# Patient Record
Sex: MALE | Race: OTHER | HISPANIC OR LATINO | URBAN - METROPOLITAN AREA
[De-identification: names, ages, dates, MRNs, and addresses within clinical notes are randomized per-mention and may not be internally consistent; named-entity substitution may affect disease eponyms.]

---

## 2017-10-26 ENCOUNTER — EMERGENCY (EMERGENCY)
Facility: HOSPITAL | Age: 67
LOS: 1 days | Discharge: HOME | End: 2017-10-26
Attending: EMERGENCY MEDICINE
Payer: MEDICAID

## 2017-10-26 VITALS
WEIGHT: 151.9 LBS | OXYGEN SATURATION: 95 % | HEART RATE: 61 BPM | SYSTOLIC BLOOD PRESSURE: 107 MMHG | DIASTOLIC BLOOD PRESSURE: 60 MMHG | RESPIRATION RATE: 18 BRPM | HEIGHT: 68.9 IN | TEMPERATURE: 98 F

## 2017-10-26 DIAGNOSIS — L03.116 CELLULITIS OF LEFT LOWER LIMB: ICD-10-CM

## 2017-10-26 DIAGNOSIS — E78.5 HYPERLIPIDEMIA, UNSPECIFIED: ICD-10-CM

## 2017-10-26 DIAGNOSIS — Z87.891 PERSONAL HISTORY OF NICOTINE DEPENDENCE: ICD-10-CM

## 2017-10-26 PROCEDURE — 99283 EMERGENCY DEPT VISIT LOW MDM: CPT

## 2017-10-26 PROCEDURE — 99284 EMERGENCY DEPT VISIT MOD MDM: CPT

## 2017-10-26 RX ORDER — DIPHENHYDRAMINE HCL 50 MG
50 CAPSULE ORAL ONCE
Qty: 0 | Refills: 0 | Status: COMPLETED | OUTPATIENT
Start: 2017-10-26 | End: 2017-10-26

## 2017-10-26 RX ADMIN — Medication 50 MILLIGRAM(S): at 19:58

## 2017-10-26 RX ADMIN — Medication 100 MILLIGRAM(S): at 20:36

## 2017-10-26 NOTE — ED ADULT NURSE NOTE - OBJECTIVE STATEMENT
redness, swelling left  lower leg from bug bite friday redness, swelling left  lower leg from bug bite friday not getting better with itchiness around the site, no drainage.

## 2017-10-26 NOTE — ED ADULT NURSE NOTE - CHIEF COMPLAINT QUOTE
Redness, swelling, warmth to left lateral calf s/p [facility] Redness extending to foot

## 2017-10-26 NOTE — ED PROVIDER NOTE - SKIN NEGATIVE STATEMENT, MLM
no abrasions, no jaundice, no lesions, no pruritis : +redness , +erythema , +warmth , +swelling on left lateral side of calf.

## 2017-10-26 NOTE — ED PROVIDER NOTE - SKIN TEMP
left lateral leg - erythema and  firm swelling/warm left lateral leg - erythema and  firm swelling, approx 2 inch diameter, no fluctuance/warm

## 2017-10-26 NOTE — ED ADULT NURSE NOTE - CHIEF COMPLAINT
The patient is a 67y Male complaining of The patient is a 67y Male complaining of redness, swelling to left lower leg from bug bite.

## 2017-10-26 NOTE — ED PROVIDER NOTE - OBJECTIVE STATEMENT
67 year old male Hx HLD ( diet controlled) coming in for left lateral calf swelling after bug bite in [place]. Pt speaks [place], daughter at bedside helped in translation. As per pt he was [place] when he experienced bug bite ( mosquito ?) on his left lateral side on last friday. after that he developed redness and swelling around area. for past few days he had pus coming out from bug bite site.  He came back yesterday and wanted to have evaluation for his bug bite so he can go to [place] next week. Pt denies any fever, body ache, chills, headache, chest pain , abd pain , N/V/D.

## 2017-10-26 NOTE — ED PROVIDER NOTE - ATTENDING CONTRIBUTION TO CARE
agree with above. gave first dose of abx.  Discussed anticipatory guidance and return precautions. Discussed results and gave copy to pt.  Dc with outpt follow up in 3 days for wound check.

## 2017-10-26 NOTE — ED PROVIDER NOTE - MEDICAL DECISION MAKING DETAILS
gave first dose of abx. marked area of erythema  Discussed anticipatory guidance and return precautions. Discussed results and gave copy to pt.  Tommy with outpt follow up in 3 days for wound check.

## 2017-10-26 NOTE — ED ADULT TRIAGE NOTE - CHIEF COMPLAINT QUOTE
Redness, swelling, warmth to left lateral calf [facility]/p [facility] extending to [facility]

## 2017-10-28 ENCOUNTER — INPATIENT (INPATIENT)
Facility: HOSPITAL | Age: 67
LOS: 3 days | Discharge: HOME | DRG: 603 | End: 2017-11-01
Attending: INTERNAL MEDICINE | Admitting: INTERNAL MEDICINE
Payer: MEDICAID

## 2017-10-28 VITALS
SYSTOLIC BLOOD PRESSURE: 140 MMHG | DIASTOLIC BLOOD PRESSURE: 81 MMHG | RESPIRATION RATE: 16 BRPM | OXYGEN SATURATION: 99 % | WEIGHT: 151.9 LBS | HEART RATE: 70 BPM | TEMPERATURE: 97 F

## 2017-10-28 NOTE — ED ADULT NURSE NOTE - ED STAT RN HANDOFF DETAILS 2
endorsed to RN Narciso in holding area in stable condition for continuation of care. pt a&ox3, ambulatory. admitted for left leg cellulitis. admitted to tele for bradycardia. family at bedside. 20G RAC.

## 2017-10-29 DIAGNOSIS — L03.90 CELLULITIS, UNSPECIFIED: ICD-10-CM

## 2017-10-29 DIAGNOSIS — Z29.9 ENCOUNTER FOR PROPHYLACTIC MEASURES, UNSPECIFIED: ICD-10-CM

## 2017-10-29 DIAGNOSIS — E78.00 PURE HYPERCHOLESTEROLEMIA, UNSPECIFIED: ICD-10-CM

## 2017-10-29 LAB
ALBUMIN SERPL ELPH-MCNC: 3.5 G/DL — SIGNIFICANT CHANGE UP (ref 3.5–5)
ALP SERPL-CCNC: 112 U/L — SIGNIFICANT CHANGE UP (ref 40–120)
ALT FLD-CCNC: 49 U/L DA — SIGNIFICANT CHANGE UP (ref 10–60)
ANION GAP SERPL CALC-SCNC: 8 MMOL/L — SIGNIFICANT CHANGE UP (ref 5–17)
APTT BLD: 29.8 SEC — SIGNIFICANT CHANGE UP (ref 27.5–37.4)
AST SERPL-CCNC: 30 U/L — SIGNIFICANT CHANGE UP (ref 10–40)
BILIRUB SERPL-MCNC: 0.3 MG/DL — SIGNIFICANT CHANGE UP (ref 0.2–1.2)
BUN SERPL-MCNC: 29 MG/DL — HIGH (ref 7–18)
CALCIUM SERPL-MCNC: 8.5 MG/DL — SIGNIFICANT CHANGE UP (ref 8.4–10.5)
CHLORIDE SERPL-SCNC: 108 MMOL/L — SIGNIFICANT CHANGE UP (ref 96–108)
CO2 SERPL-SCNC: 24 MMOL/L — SIGNIFICANT CHANGE UP (ref 22–31)
CREAT SERPL-MCNC: 0.92 MG/DL — SIGNIFICANT CHANGE UP (ref 0.5–1.3)
CRP SERPL-MCNC: 3 MG/DL — HIGH (ref 0–0.4)
D DIMER BLD IA.RAPID-MCNC: 193 NG/ML DDU — SIGNIFICANT CHANGE UP
ERYTHROCYTE [SEDIMENTATION RATE] IN BLOOD: 24 MM/HR — HIGH (ref 0–20)
GLUCOSE SERPL-MCNC: 103 MG/DL — HIGH (ref 70–99)
HCT VFR BLD CALC: 41.6 % — SIGNIFICANT CHANGE UP (ref 39–50)
HGB BLD-MCNC: 13.8 G/DL — SIGNIFICANT CHANGE UP (ref 13–17)
INR BLD: 0.96 RATIO — SIGNIFICANT CHANGE UP (ref 0.88–1.16)
MCHC RBC-ENTMCNC: 33 PG — SIGNIFICANT CHANGE UP (ref 27–34)
MCHC RBC-ENTMCNC: 33.2 GM/DL — SIGNIFICANT CHANGE UP (ref 32–36)
MCV RBC AUTO: 99.2 FL — SIGNIFICANT CHANGE UP (ref 80–100)
PLATELET # BLD AUTO: 197 K/UL — SIGNIFICANT CHANGE UP (ref 150–400)
POTASSIUM SERPL-MCNC: 4.4 MMOL/L — SIGNIFICANT CHANGE UP (ref 3.5–5.3)
POTASSIUM SERPL-SCNC: 4.4 MMOL/L — SIGNIFICANT CHANGE UP (ref 3.5–5.3)
PROT SERPL-MCNC: 7.1 G/DL — SIGNIFICANT CHANGE UP (ref 6–8.3)
PROTHROM AB SERPL-ACNC: 10.4 SEC — SIGNIFICANT CHANGE UP (ref 9.8–12.7)
RBC # BLD: 4.2 M/UL — SIGNIFICANT CHANGE UP (ref 4.2–5.8)
RBC # FLD: 12 % — SIGNIFICANT CHANGE UP (ref 10.3–14.5)
SODIUM SERPL-SCNC: 140 MMOL/L — SIGNIFICANT CHANGE UP (ref 135–145)
WBC # BLD: 5.2 K/UL — SIGNIFICANT CHANGE UP (ref 3.8–10.5)
WBC # FLD AUTO: 5.2 K/UL — SIGNIFICANT CHANGE UP (ref 3.8–10.5)

## 2017-10-29 PROCEDURE — 99053 MED SERV 10PM-8AM 24 HR FAC: CPT

## 2017-10-29 PROCEDURE — 99285 EMERGENCY DEPT VISIT HI MDM: CPT | Mod: 25

## 2017-10-29 PROCEDURE — 73590 X-RAY EXAM OF LOWER LEG: CPT | Mod: 26,LT

## 2017-10-29 RX ORDER — INFLUENZA VIRUS VACCINE 15; 15; 15; 15 UG/.5ML; UG/.5ML; UG/.5ML; UG/.5ML
0.5 SUSPENSION INTRAMUSCULAR ONCE
Qty: 0 | Refills: 0 | Status: COMPLETED | OUTPATIENT
Start: 2017-10-29 | End: 2017-11-01

## 2017-10-29 RX ORDER — VANCOMYCIN HCL 1 G
1000 VIAL (EA) INTRAVENOUS ONCE
Qty: 0 | Refills: 0 | Status: COMPLETED | OUTPATIENT
Start: 2017-10-29 | End: 2017-10-29

## 2017-10-29 RX ORDER — SODIUM CHLORIDE 9 MG/ML
1000 INJECTION INTRAMUSCULAR; INTRAVENOUS; SUBCUTANEOUS ONCE
Qty: 0 | Refills: 0 | Status: COMPLETED | OUTPATIENT
Start: 2017-10-29 | End: 2017-10-29

## 2017-10-29 RX ORDER — OXYCODONE AND ACETAMINOPHEN 5; 325 MG/1; MG/1
1 TABLET ORAL ONCE
Qty: 0 | Refills: 0 | Status: DISCONTINUED | OUTPATIENT
Start: 2017-10-29 | End: 2017-10-29

## 2017-10-29 RX ORDER — PIPERACILLIN AND TAZOBACTAM 4; .5 G/20ML; G/20ML
3.38 INJECTION, POWDER, LYOPHILIZED, FOR SOLUTION INTRAVENOUS EVERY 8 HOURS
Qty: 0 | Refills: 0 | Status: DISCONTINUED | OUTPATIENT
Start: 2017-10-29 | End: 2017-10-30

## 2017-10-29 RX ORDER — PIPERACILLIN AND TAZOBACTAM 4; .5 G/20ML; G/20ML
3.38 INJECTION, POWDER, LYOPHILIZED, FOR SOLUTION INTRAVENOUS ONCE
Qty: 0 | Refills: 0 | Status: COMPLETED | OUTPATIENT
Start: 2017-10-29 | End: 2017-10-29

## 2017-10-29 RX ORDER — ACETAMINOPHEN 500 MG
650 TABLET ORAL EVERY 6 HOURS
Qty: 0 | Refills: 0 | Status: DISCONTINUED | OUTPATIENT
Start: 2017-10-29 | End: 2017-11-01

## 2017-10-29 RX ADMIN — OXYCODONE AND ACETAMINOPHEN 1 TABLET(S): 5; 325 TABLET ORAL at 03:41

## 2017-10-29 RX ADMIN — PIPERACILLIN AND TAZOBACTAM 200 GRAM(S): 4; .5 INJECTION, POWDER, LYOPHILIZED, FOR SOLUTION INTRAVENOUS at 06:31

## 2017-10-29 RX ADMIN — OXYCODONE AND ACETAMINOPHEN 1 TABLET(S): 5; 325 TABLET ORAL at 03:44

## 2017-10-29 RX ADMIN — PIPERACILLIN AND TAZOBACTAM 25 GRAM(S): 4; .5 INJECTION, POWDER, LYOPHILIZED, FOR SOLUTION INTRAVENOUS at 21:58

## 2017-10-29 RX ADMIN — Medication 250 MILLIGRAM(S): at 03:44

## 2017-10-29 RX ADMIN — SODIUM CHLORIDE 1000 MILLILITER(S): 9 INJECTION INTRAMUSCULAR; INTRAVENOUS; SUBCUTANEOUS at 03:42

## 2017-10-29 RX ADMIN — PIPERACILLIN AND TAZOBACTAM 25 GRAM(S): 4; .5 INJECTION, POWDER, LYOPHILIZED, FOR SOLUTION INTRAVENOUS at 13:09

## 2017-10-29 NOTE — H&P ADULT - ASSESSMENT
66 yo male came from home with PMH of Hypercholesterolemia presents to ED c/o left leg pain with associated L leg swelling for 9 days. As per pt when he was in [facility] he experienced bug bite on his left lateral side 9 days ago, after that he developed burning pain, redness and swelling around area. For past few days he had pus coming out from bug bite site. [place] from [facility] 5 days ago. Pt was brought to the ED x4 days ago and was diagnosed with cellulitis, and was subsequently placed on doxycycline however symptoms have currently worsened. Family notes pt is having difficulty ambulating secondary to pain. In ER, pt vitals stable, afebrile, WBC wnl, s/p 1 dose vanco and zosyn.  Pt will be admitted to medicine  as cellulitis.

## 2017-10-29 NOTE — ED PROVIDER NOTE - OBJECTIVE STATEMENT
Physician as . 66 y/o M pt with PMHx of Hypercholesterolemia and no significant PSHx presents to ED c/o L leg pain with associated L leg swelling x8 days. As per family, pt recently [place] x[place] ago from [facility]; pt was brought to the ED x3 days ago and was diagnosed with cellulitis, and was subsequently placed on Abx's however sx's have currently worsened. Family notes pt is having difficulty ambulating secondary to pain. Pt denies fever, chills, any other complaints. Pt also denies Hx of DM, Hx of HTN, or taking any medication for pain relief. NKDA.

## 2017-10-29 NOTE — ED ADULT NURSE REASSESSMENT NOTE - NS ED NURSE REASSESS COMMENT FT1
pt a&ox3, admitted for left leg cellulitis. on tele monitoring, yue sinus. family at bedside. pending bed availability on tele unit. offers no complaints at this time.

## 2017-10-29 NOTE — H&P ADULT - PROBLEM SELECTOR PLAN 1
hx of bug bite, burning pain, redness and swelling on left leg, afebrile, WBC nl  -s/p 1 dose vanco and zosyn   -s/p 1L NS bolus  -ESR 24, f/u CRP  -f/u x ray of left leg  -f/u US doppler of left leg  -f/u blood culture  -ID consult hx of bug bite, burning pain, redness and swelling on left leg, afebrile, WBC nl  -s/p 1 dose vanco and zosyn   -s/p 1L NS bolus  -c/w zosyn q8hrs  -ESR 24, f/u CRP  -f/u x ray of left leg  -f/u US doppler of left leg  -f/u blood culture  -ID consult hx of bug bite, burning pain, redness and swelling on left leg, afebrile, WBC nl  -s/p 1 dose vanco and zosyn   -s/p 1L NS bolus  -c/w zosyn q8hrs  -ESR 24, f/u CRP  -f/u x ray of left leg  -f/u US doppler of left leg  -f/u blood culture  -ID consulted,   Left calf, hx of bug bite, burning pain, redness and swelling on left leg, afebrile, WBC nl  -s/p 1 dose vanco and zosyn   -s/p 1L NS bolus  -c/w zosyn q8hrs  -ESR 24, f/u CRP  -f/u x ray of left leg  -f/u US doppler of left leg  -f/u blood culture  -ID consulted,

## 2017-10-29 NOTE — ED PROVIDER NOTE - SKIN WOUND DESCRIPTION
1cm of erythema and warmth to the L lateral leg with no fluctuance; center of area is a very small area of necrosis but no palpable fluctuation or anything drainable

## 2017-10-29 NOTE — H&P ADULT - NSHPLABSRESULTS_GEN_ALL_CORE
Vital Signs Last 24 Hrs  T(C): 36.3 (28 Oct 2017 23:24), Max: 36.3 (28 Oct 2017 23:24)  T(F): 97.4 (28 Oct 2017 23:24), Max: 97.4 (28 Oct 2017 23:24)  HR: 70 (28 Oct 2017 23:24) (70 - 70)  BP: 140/81 (28 Oct 2017 23:24) (140/81 - 140/81)  BP(mean): --  RR: 16 (28 Oct 2017 23:24) (16 - 16)  SpO2: 99% (28 Oct 2017 23:24) (99% - 99%)    D-Dimer Assay, Quantitative (10.29.17 @ 03:42)    D-Dimer Assay, Quantitative: 193: D-Dimer result less than 230 ng/mL DDU correlates with the absence of  thrombosis in a patient with a low and moderate       pre-test probability of thrombosis.  1 DDU is approximately equal to  2 ng/mL FEU (previous units). ng/mL DDU      Complete Blood Count (10.29.17 @ 03:21)    WBC Count: 5.2 K/uL    RBC Count: 4.20 M/uL    Hemoglobin: 13.8 g/dL    Hematocrit: 41.6 %    Mean Cell Volume: 99.2 fl    Mean Cell Hemoglobin: 33.0 pg    Mean Cell Hemoglobin Conc: 33.2 gm/dL    Red Cell Distrib Width: 12.0 %    Platelet Count - Automated: 197 K/uL      Comprehensive Metabolic Panel (10.29.17 @ 03:21)    Sodium, Serum: 140 mmol/L    Potassium, Serum: 4.4 mmol/L    Chloride, Serum: 108 mmol/L    Carbon Dioxide, Serum: 24 mmol/L    Anion Gap, Serum: 8 mmol/L    Blood Urea Nitrogen, Serum: 29 mg/dL    Creatinine, Serum: 0.92 mg/dL    Glucose, Serum: 103 mg/dL    Calcium, Total Serum: 8.5 mg/dL    Protein Total, Serum: 7.1 g/dL    Albumin, Serum: 3.5 g/dL    Bilirubin Total, Serum: 0.3 mg/dL    Alkaline Phosphatase, Serum: 112 U/L    Aspartate Aminotransferase (AST/SGOT): 30 U/L    Alanine Aminotransferase (ALT/SGPT): 49 U/L DA    eGFR if Non : 87:

## 2017-10-29 NOTE — H&P ADULT - ATTENDING COMMENTS
Patient seen and examined. Patient's history, vitals, labs, imaging studies reviewed. Discussed with above resident, agree with note with edits. Plan of care discussed with patient, and agrees, all questions answered.   Radha Alegria MD  10/29/2017

## 2017-10-29 NOTE — H&P ADULT - HISTORY OF PRESENT ILLNESS
66 yo male came from home with PMH of Hypercholesterolemia presents to ED c/o left leg pain with associated L leg swelling for 9 days. As per pt when he was in [facility] he experienced bug bite on his left lateral side 9 days ago, after that he developed burning pain, redness and swelling around area. For past few days he had pus coming out from bug bite site. [place] from [facility] 5 days ago. Pt was brought to the ED x4 days ago and was diagnosed with cellulitis, and was subsequently placed on doxycycline however symptoms have currently worsened. Family notes pt is having difficulty ambulating secondary to pain. Pt denies fever, chills, headache, chest pain, abd pain , n/v/d/c or any other complaints.    In ER, pt vitals stable, afebrile, WBC wnl, s/p 1 dose vanco and zosyn.

## 2017-10-30 LAB
ANION GAP SERPL CALC-SCNC: 5 MMOL/L — SIGNIFICANT CHANGE UP (ref 5–17)
BUN SERPL-MCNC: 19 MG/DL — HIGH (ref 7–18)
CALCIUM SERPL-MCNC: 8.9 MG/DL — SIGNIFICANT CHANGE UP (ref 8.4–10.5)
CHLORIDE SERPL-SCNC: 107 MMOL/L — SIGNIFICANT CHANGE UP (ref 96–108)
CHOLEST SERPL-MCNC: 208 MG/DL — HIGH (ref 10–199)
CO2 SERPL-SCNC: 26 MMOL/L — SIGNIFICANT CHANGE UP (ref 22–31)
CREAT SERPL-MCNC: 0.94 MG/DL — SIGNIFICANT CHANGE UP (ref 0.5–1.3)
FOLATE SERPL-MCNC: 16.3 NG/ML — SIGNIFICANT CHANGE UP (ref 4.8–24.2)
GLUCOSE SERPL-MCNC: 111 MG/DL — HIGH (ref 70–99)
HBA1C BLD-MCNC: 5.9 % — HIGH (ref 4–5.6)
HCT VFR BLD CALC: 43.5 % — SIGNIFICANT CHANGE UP (ref 39–50)
HDLC SERPL-MCNC: 49 MG/DL — SIGNIFICANT CHANGE UP (ref 40–125)
HGB BLD-MCNC: 14.4 G/DL — SIGNIFICANT CHANGE UP (ref 13–17)
LIPID PNL WITH DIRECT LDL SERPL: 139 MG/DL — SIGNIFICANT CHANGE UP
MAGNESIUM SERPL-MCNC: 2.4 MG/DL — SIGNIFICANT CHANGE UP (ref 1.6–2.6)
MCHC RBC-ENTMCNC: 32.3 PG — SIGNIFICANT CHANGE UP (ref 27–34)
MCHC RBC-ENTMCNC: 33 GM/DL — SIGNIFICANT CHANGE UP (ref 32–36)
MCV RBC AUTO: 97.9 FL — SIGNIFICANT CHANGE UP (ref 80–100)
PHOSPHATE SERPL-MCNC: 3.2 MG/DL — SIGNIFICANT CHANGE UP (ref 2.5–4.5)
PLATELET # BLD AUTO: 224 K/UL — SIGNIFICANT CHANGE UP (ref 150–400)
POTASSIUM SERPL-MCNC: 4 MMOL/L — SIGNIFICANT CHANGE UP (ref 3.5–5.3)
POTASSIUM SERPL-SCNC: 4 MMOL/L — SIGNIFICANT CHANGE UP (ref 3.5–5.3)
RBC # BLD: 4.44 M/UL — SIGNIFICANT CHANGE UP (ref 4.2–5.8)
RBC # FLD: 11.8 % — SIGNIFICANT CHANGE UP (ref 10.3–14.5)
SODIUM SERPL-SCNC: 138 MMOL/L — SIGNIFICANT CHANGE UP (ref 135–145)
TOTAL CHOLESTEROL/HDL RATIO MEASUREMENT: 4.2 RATIO — SIGNIFICANT CHANGE UP (ref 3.4–9.6)
TRIGL SERPL-MCNC: 99 MG/DL — SIGNIFICANT CHANGE UP (ref 10–149)
TSH SERPL-MCNC: 2.45 UU/ML — SIGNIFICANT CHANGE UP (ref 0.34–4.82)
TSH SERPL-MCNC: 2.52 UU/ML — SIGNIFICANT CHANGE UP (ref 0.34–4.82)
VIT B12 SERPL-MCNC: 449 PG/ML — SIGNIFICANT CHANGE UP (ref 243–894)
WBC # BLD: 3.9 K/UL — SIGNIFICANT CHANGE UP (ref 3.8–10.5)
WBC # FLD AUTO: 3.9 K/UL — SIGNIFICANT CHANGE UP (ref 3.8–10.5)

## 2017-10-30 PROCEDURE — 93971 EXTREMITY STUDY: CPT | Mod: 26,LT

## 2017-10-30 PROCEDURE — 99222 1ST HOSP IP/OBS MODERATE 55: CPT

## 2017-10-30 RX ORDER — VANCOMYCIN HCL 1 G
VIAL (EA) INTRAVENOUS
Qty: 0 | Refills: 0 | Status: DISCONTINUED | OUTPATIENT
Start: 2017-10-30 | End: 2017-11-01

## 2017-10-30 RX ORDER — VANCOMYCIN HCL 1 G
1000 VIAL (EA) INTRAVENOUS ONCE
Qty: 0 | Refills: 0 | Status: COMPLETED | OUTPATIENT
Start: 2017-10-30 | End: 2017-10-30

## 2017-10-30 RX ORDER — VANCOMYCIN HCL 1 G
1000 VIAL (EA) INTRAVENOUS EVERY 12 HOURS
Qty: 0 | Refills: 0 | Status: DISCONTINUED | OUTPATIENT
Start: 2017-10-31 | End: 2017-11-01

## 2017-10-30 RX ADMIN — PIPERACILLIN AND TAZOBACTAM 25 GRAM(S): 4; .5 INJECTION, POWDER, LYOPHILIZED, FOR SOLUTION INTRAVENOUS at 06:00

## 2017-10-30 RX ADMIN — Medication 250 MILLIGRAM(S): at 17:51

## 2017-10-30 RX ADMIN — PIPERACILLIN AND TAZOBACTAM 25 GRAM(S): 4; .5 INJECTION, POWDER, LYOPHILIZED, FOR SOLUTION INTRAVENOUS at 13:58

## 2017-10-30 NOTE — CHART NOTE - NSCHARTNOTEFT_GEN_A_CORE
Surgery Consult    Full consult note to follow  LLE cellulitis secondary to insect bite. Evidence of superficial skin necrosis at one small punctate area. No fluctuance. Minimal edema. Per patient/daughter, redness and swelling is improving with abx.  No indication for incision/drainage at this time. Continue abx  If clinically well appearing and afebrile with normal WBC, can likely dc home tomorrow

## 2017-10-30 NOTE — CONSULT NOTE ADULT - SKIN COMMENTS
Redness, erythema, and warmth of skin over lateral left lower leg Redness, erythema, and warmth of skin over lateral left lower leg, approx 3 x 3 cm area of induration with a point of entry, no fluctuance now or drainage from area

## 2017-10-30 NOTE — PROGRESS NOTE ADULT - SUBJECTIVE AND OBJECTIVE BOX
INTERVAL HPI/OVERNIGHT EVENTS:  Pt states no overnight complaints, still has pain on the left later calf.     T(C): 36.1 (10-30-17 @ 11:03), Max: 36.7 (10-29-17 @ 23:38)  HR: 61 (10-30-17 @ 11:03) (53 - 62)  BP: 106/67 (10-30-17 @ 11:03) (101/48 - 118/79)  RR: 16 (10-30-17 @ 11:03) (16 - 18)  SpO2: 97% (10-30-17 @ 11:03) (96% - 100%)      REVIEW OF SYSTEMS: denies fever, chills, SOB, palpitations, chest pain, abdominal pain, nausea, vomitting, diarrhea, constipation, dizziness    MEDICATIONS  (STANDING):  influenza   Vaccine 0.5 milliLiter(s) IntraMuscular once  vancomycin  IVPB 1000 milliGRAM(s) IV Intermittent once  vancomycin  IVPB        MEDICATIONS  (PRN):  acetaminophen   Tablet. 650 milliGRAM(s) Oral every 6 hours PRN Moderate Pain (4 - 6)      PHYSICAL EXAM:  GENERAL: NAD, well-groomed, well-developed  HEAD:  Atraumatic, Normocephalic  EYES: EOMI,  conjunctiva and sclera clear  NECK: Supple, No JVD  NERVOUS SYSTEM:  Alert & Oriented X3  CHEST/LUNG: Clear to auscultation bilaterally; No rales, rhonchi, wheezing, or rubs  HEART: Regular rate and rhythm; No murmurs, rubs, or gallops  ABDOMEN: Soft, Nontender, Nondistended; Bowel sounds present  EXTREMITIES:  2+ Peripheral Pulses, No clubbing, cyanosis, or edema; left lateral calf with 1x1 cm lesion, no pus drainable, erythematous and indurated area of 3x3 cm    LABS:                        14.4   3.9   )-----------( 224      ( 30 Oct 2017 06:01 )             43.5     10-30    138  |  107  |  19<H>  ----------------------------<  111<H>  4.0   |  26  |  0.94    Ca    8.9      30 Oct 2017 06:01  Phos  3.2     10-30  Mg     2.4     10-30    TPro  7.1  /  Alb  3.5  /  TBili  0.3  /  DBili  x   /  AST  30  /  ALT  49  /  AlkPhos  112  10-29    PT/INR - ( 29 Oct 2017 03:42 )   PT: 10.4 sec;   INR: 0.96 ratio         PTT - ( 29 Oct 2017 03:42 )  PTT:29.8 sec    CAPILLARY BLOOD GLUCOSE INTERVAL HPI/OVERNIGHT EVENTS:  Pt states no overnight complaints, still has pain on the left lateral calf.     T(C): 36.1 (10-30-17 @ 11:03), Max: 36.7 (10-29-17 @ 23:38)  HR: 61 (10-30-17 @ 11:03) (53 - 62)  BP: 106/67 (10-30-17 @ 11:03) (101/48 - 118/79)  RR: 16 (10-30-17 @ 11:03) (16 - 18)  SpO2: 97% (10-30-17 @ 11:03) (96% - 100%)      REVIEW OF SYSTEMS: denies fever, chills, SOB, palpitations, chest pain, abdominal pain, nausea, vomitting, diarrhea, constipation, dizziness  All other ROS are negative    MEDICATIONS  (STANDING):  influenza   Vaccine 0.5 milliLiter(s) IntraMuscular once  vancomycin  IVPB 1000 milliGRAM(s) IV Intermittent once  vancomycin  IVPB        MEDICATIONS  (PRN):  acetaminophen   Tablet. 650 milliGRAM(s) Oral every 6 hours PRN Moderate Pain (4 - 6)      PHYSICAL EXAM:  GENERAL: NAD, well-groomed, well-developed  HEAD:  Atraumatic, Normocephalic  NECK: supple  EYES: EOMI,  conjunctiva and sclera clear  NECK: Supple, No JVD  NERVOUS SYSTEM:  Alert & Oriented X3  CHEST/LUNG: Clear to auscultation bilaterally; No rales, rhonchi, wheezing, or rubs  HEART: Regular rate and rhythm; No murmurs, rubs, or gallops  ABDOMEN: Soft, Nontender, Nondistended; Bowel sounds present  LYMPH: no lymphadenopathy noted  EXTREMITIES:  2+ Peripheral Pulses, No clubbing, cyanosis, or edema; left lateral calf with 1x1 cm lesion, no pus drainable, erythematous and indurated area of 3x3 cm    LABS:                        14.4   3.9   )-----------( 224      ( 30 Oct 2017 06:01 )             43.5     10-30    138  |  107  |  19<H>  ----------------------------<  111<H>  4.0   |  26  |  0.94    Ca    8.9      30 Oct 2017 06:01  Phos  3.2     10-30  Mg     2.4     10-30    TPro  7.1  /  Alb  3.5  /  TBili  0.3  /  DBili  x   /  AST  30  /  ALT  49  /  AlkPhos  112  10-29    PT/INR - ( 29 Oct 2017 03:42 )   PT: 10.4 sec;   INR: 0.96 ratio         PTT - ( 29 Oct 2017 03:42 )  PTT:29.8 sec    CAPILLARY BLOOD GLUCOSE

## 2017-10-30 NOTE — PROGRESS NOTE ADULT - PROBLEM SELECTOR PLAN 2
stable, on diet control   lipid profile significant for elevated cholesterol 208  HbA1c - 5.9%   TSH wnl

## 2017-10-30 NOTE — CONSULT NOTE ADULT - GASTROINTESTINAL DETAILS
soft/nontender/no distention soft/no guarding/nontender/bowel sounds normal/no rigidity/no distention

## 2017-10-30 NOTE — CONSULT NOTE ADULT - SUBJECTIVE AND OBJECTIVE BOX
HPI:  68 yo male came from home with PMH of Hypercholesterolemia presents to ED c/o left leg pain with associated L leg swelling for 9 days. As per pt when he was in [facility] he experienced bug bite on his left lateral side 9 days ago, after that he developed burning pain, redness and swelling around area. For past few days he had pus coming out from bug bite site. Pt [place] from [facility] 5 days ago. Pt was brought to the ED x4 days ago and was diagnosed with cellulitis, and was subsequently placed on doxycycline however symptoms have currently worsened. Family notes pt is having difficulty ambulating secondary to pain. Pt denies fever, chills, headache, chest pain, abd pain , n/v/d/c or any other complaints.    Patient was given vancomycin and zosyn in the ED. Patient was admitted for left leg cellulitis and was continued on Zosyn 3.375g IV q8h.     SH - Occasional alcohol use  FH - Mother with DM    Vital Signs Last 24 Hrs  T(C): 36.1 (30 Oct 2017 11:03), Max: 36.7 (29 Oct 2017 23:38)  T(F): 97 (30 Oct 2017 11:03), Max: 98 (29 Oct 2017 23:38)  HR: 61 (30 Oct 2017 11:03) (53 - 62)  BP: 106/67 (30 Oct 2017 11:03) (101/48 - 118/79)  RR: 16 (30 Oct 2017 11:03) (16 - 18)  SpO2: 97% (30 Oct 2017 11:03) (96% - 100%)    MEDICATIONS  (STANDING):  influenza   Vaccine 0.5 milliLiter(s) IntraMuscular once  piperacillin/tazobactam IVPB. 3.375 Gram(s) IV Intermittent every 8 hours    MEDICATIONS  (PRN):  acetaminophen   Tablet. 650 milliGRAM(s) Oral every 6 hours PRN Moderate Pain (4 - 6)    LABS               14.4   3.9   )-----------( 224      ( 30 Oct 2017 06:01 )             43.5     10-30    138  |  107  |  19<H>  ----------------------------<  111<H>  4.0   |  26  |  0.94    Ca    8.9      30 Oct 2017 06:01  Phos  3.2     10-30  Mg     2.4     10-30    TPro  7.1  /  Alb  3.5  /  TBili  0.3  /  DBili  x   /  AST  30  /  ALT  49  /  AlkPhos  112  10-29    PT/INR - ( 29 Oct 2017 03:42 )   PT: 10.4 sec;   INR: 0.96 ratio    PTT - ( 29 Oct 2017 03:42 )  PTT:29.8 sec    RADIOLOGICAL STUDIES    EXAM:  LEG AP&LAT - LEFT                          PROCEDURE DATE:  10/29/2017      INTERPRETATION:  Left tib-fib. 2 views obtained. Patient has local pain   and swelling.    Arterial calcifications are noted.    The knee and hip are relatively free of degeneration. There is no bone   destruction or fracture.    IMPRESSION: Arterial calcifications.    KIRK FREGOSO M.D., ATTENDING RADIOLOGIST  This document has been electronically signed. Oct 29 2017  9:07AM HPI:  68 yo male came from home with PMH of Hypercholesterolemia presents to ED c/o left leg pain with associated L leg swelling for 9 days. As per pt when he was in [facility] he experienced bug bite on his left lateral side 9 days ago, after that he developed burning pain, redness and swelling around area. For past few days he had pus coming out from bug bite site. Pt arrived [place] from [facility] 5 days ago. Pt was brought to the ED x4 days ago and was diagnosed with cellulitis, and was subsequently placed on doxycycline however symptoms have currently worsened. Family notes pt is having difficulty ambulating secondary to pain. Pt denies fever, chills, headache, chest pain, abd pain , n/v/d/c or any other complaints. He had taken only 2 days of doxycycline prior to coming here. His daughter had expressed some pus from wound and so did the ED.    Patient was given vancomycin and zosyn in the ED. Patient was admitted for left leg cellulitis and was continued on Zosyn 3.375g IV q8h.     SH - Occasional alcohol use  FH - Mother with DM    Vital Signs Last 24 Hrs  T(C): 36.1 (30 Oct 2017 11:03), Max: 36.7 (29 Oct 2017 23:38)  T(F): 97 (30 Oct 2017 11:03), Max: 98 (29 Oct 2017 23:38)  HR: 61 (30 Oct 2017 11:03) (53 - 62)  BP: 106/67 (30 Oct 2017 11:03) (101/48 - 118/79)  RR: 16 (30 Oct 2017 11:03) (16 - 18)  SpO2: 97% (30 Oct 2017 11:03) (96% - 100%)    MEDICATIONS  (STANDING):  influenza   Vaccine 0.5 milliLiter(s) IntraMuscular once  piperacillin/tazobactam IVPB. 3.375 Gram(s) IV Intermittent every 8 hours    MEDICATIONS  (PRN):  acetaminophen   Tablet. 650 milliGRAM(s) Oral every 6 hours PRN Moderate Pain (4 - 6)    LABS               14.4   3.9   )-----------( 224      ( 30 Oct 2017 06:01 )             43.5     10-30    138  |  107  |  19<H>  ----------------------------<  111<H>  4.0   |  26  |  0.94    Ca    8.9      30 Oct 2017 06:01  Phos  3.2     10-30  Mg     2.4     10-30    TPro  7.1  /  Alb  3.5  /  TBili  0.3  /  DBili  x   /  AST  30  /  ALT  49  /  AlkPhos  112  10-29    PT/INR - ( 29 Oct 2017 03:42 )   PT: 10.4 sec;   INR: 0.96 ratio    PTT - ( 29 Oct 2017 03:42 )  PTT:29.8 sec    RADIOLOGICAL STUDIES    EXAM:  LEG AP&LAT - LEFT                          PROCEDURE DATE:  10/29/2017      INTERPRETATION:  Left tib-fib. 2 views obtained. Patient has local pain   and swelling.    Arterial calcifications are noted.    The knee and hip are relatively free of degeneration. There is no bone   destruction or fracture.    IMPRESSION: Arterial calcifications.    KIRK FREGOSO M.D., ATTENDING RADIOLOGIST  This document has been electronically signed. Oct 29 2017  9:07AM

## 2017-10-30 NOTE — CONSULT NOTE ADULT - MUSCULOSKELETAL
details… detailed exam no joint warmth/no joint swelling/no joint erythema/ROM intact/calf tenderness

## 2017-10-30 NOTE — PROGRESS NOTE ADULT - ASSESSMENT
66 yo male came from home with PMH of Hypercholesterolemia presents to ED c/o left leg pain with associated L leg swelling for 9 days. As per pt when he was in [facility] he experienced bug bite on his left lateral side 9 days ago, after that he developed burning pain, redness and swelling around area. For past few days he had pus coming out from bug bite site. [place] [facility] 5 days ago. Pt was brought to the ED x4 days ago and was diagnosed with cellulitis, and was subsequently placed on doxycycline however symptoms have currently worsened. Family notes pt is having difficulty ambulating secondary to pain. In ER, pt vitals stable, afebrile, WBC wnl, s/p 1 dose vanco and zosyn.  Pt will be admitted to medicine  as cellulitis. 66 yo male came from home with PMH of Hypercholesterolemia presents to ED c/o left leg pain with associated L leg swelling for 9 days. As per pt when he was in [facility] he experienced bug bite on his left lateral side 9 days ago, after that he developed burning pain, redness and swelling around area. For past few days he had pus coming out from bug bite site. Pt [place] from [facility] 5 days ago. Pt was brought to the ED x4 days ago and was diagnosed with cellulitis, and was subsequently placed on doxycycline however symptoms have currently worsened. Family notes pt is having difficulty ambulating secondary to pain. In ER, pt vitals stable, afebrile, WBC wnl, s/p 1 dose vanco and zosyn. Admitted to medicine for left leg cellulitis.

## 2017-10-30 NOTE — PROGRESS NOTE ADULT - PROBLEM SELECTOR PLAN 1
Left calf, hx of bug bite, burning pain, redness and swelling on left leg, afebrile, WBC nl  -s/p 1 dose vanco and zosyn   -s/p 1L NS bolus  -started on zosyn q8hrs, switched to vanc 1g q12 as per ID Dr. Coker recommendations  -ESR 24,  CRP - 3.0  -x ray of left leg - no abnormalities  -f/u US doppler of left leg  -f/u blood culture Left calf, hx of bug bite, burning pain, redness and swelling on left leg, afebrile, WBC nl  -s/p 1 dose vanco and zosyn   -s/p 1L NS bolus  -on zosyn q8hrs, switched to vanc 1g q12 as per ID Dr. Coker recommendations  -ESR 24,  CRP - 3.0  -x ray of left leg - no abnormalities  -f/u US doppler of left leg  -f/u blood culture

## 2017-10-30 NOTE — CONSULT NOTE ADULT - ASSESSMENT
67 year male with LLE bug bite wound with cellulitis, no need for surgical debridement    1)continue antibiotics per ID  2) continue medical management  3) case and plan discussed with Dr. Deras and agrees
left leg cellulitis/ abscess  plan - dc zosyn  start vanco 1 gm iv q12hrs  surgical eval to see if he needs an I&D

## 2017-10-30 NOTE — CONSULT NOTE ADULT - MUSCULOSKELETAL COMMENTS
Left lateral leg pain that does not travel to upper leg or to foot Tender to palpation over left lateral calf, around site of cellulitis

## 2017-10-30 NOTE — CONSULT NOTE ADULT - SUBJECTIVE AND OBJECTIVE BOX
67y Male with PMHx of HLD presents to ED with two weeks of worsening bug bite wound in LLE. Pt lives in [place] and states that he was bit by an unknown bug in [place]. He reports that the bite was very small initially and did not bother him. [place] then [place] the [place] and that was when the bite became worse. He developed redness and leg swelling. His daughter squeezed the bug bite which was the size of a pimple and reports that some pus was expressed. The wound became worse and the pt developed difficulty walking. He denies fevers at home, no nausea or vomiting, no decreased range of motion in the extremity. He has full sensation in the extremity. [place] upcoming [facility] marathon.        pmhx: as above  pshx: none    meds:  acetaminophen   Tablet. 650 milliGRAM(s) Oral every 6 hours PRN  influenza   Vaccine 0.5 milliLiter(s) IntraMuscular once  vancomycin  IVPB        No Known Allergies    vitals:  T(C): 36.5 (10-30-17 @ 14:51), Max: 36.7 (10-29-17 @ 23:38)  HR: 66 (10-30-17 @ 14:51) (53 - 66)  BP: 115/64 (10-30-17 @ 14:51) (101/48 - 115/64)  RR: 16 (10-30-17 @ 14:51) (16 - 18)  SpO2: 97% (10-30-17 @ 14:51) (96% - 100%)    Gen:A&O x3, NAD  Abdomen: soft, nontender, nondistended, no masses, no guarding, no rebound tenderness  Lower Extremities: 1x1cm wound in lateral aspect of LLE with overlying necrotic base and mild surrounding erythema, no evidence of underlying abscess formation, no edema bilaterally, palpable pedal pulses bilaterally                          14.4   3.9   )-----------( 224      ( 30 Oct 2017 06:01 )             43.5           10-30    138  |  107  |  19<H>  ----------------------------<  111<H>  4.0   |  26  |  0.94    Ca    8.9      30 Oct 2017 06:01  Phos  3.2     10-30  Mg     2.4     10-30    TPro  7.1  /  Alb  3.5  /  TBili  0.3  /  DBili  x   /  AST  30  /  ALT  49  /  AlkPhos  112  10-29

## 2017-10-31 DIAGNOSIS — L03.116 CELLULITIS OF LEFT LOWER LIMB: ICD-10-CM

## 2017-10-31 LAB
ALBUMIN SERPL ELPH-MCNC: 3.3 G/DL — LOW (ref 3.5–5)
ALP SERPL-CCNC: 103 U/L — SIGNIFICANT CHANGE UP (ref 40–120)
ALT FLD-CCNC: 58 U/L DA — SIGNIFICANT CHANGE UP (ref 10–60)
ANION GAP SERPL CALC-SCNC: 4 MMOL/L — LOW (ref 5–17)
AST SERPL-CCNC: 38 U/L — SIGNIFICANT CHANGE UP (ref 10–40)
BILIRUB SERPL-MCNC: 0.3 MG/DL — SIGNIFICANT CHANGE UP (ref 0.2–1.2)
BUN SERPL-MCNC: 23 MG/DL — HIGH (ref 7–18)
CALCIUM SERPL-MCNC: 9.2 MG/DL — SIGNIFICANT CHANGE UP (ref 8.4–10.5)
CHLORIDE SERPL-SCNC: 106 MMOL/L — SIGNIFICANT CHANGE UP (ref 96–108)
CO2 SERPL-SCNC: 29 MMOL/L — SIGNIFICANT CHANGE UP (ref 22–31)
CREAT SERPL-MCNC: 1.07 MG/DL — SIGNIFICANT CHANGE UP (ref 0.5–1.3)
GLUCOSE SERPL-MCNC: 120 MG/DL — HIGH (ref 70–99)
HCT VFR BLD CALC: 43.6 % — SIGNIFICANT CHANGE UP (ref 39–50)
HGB BLD-MCNC: 14.5 G/DL — SIGNIFICANT CHANGE UP (ref 13–17)
MCHC RBC-ENTMCNC: 33 PG — SIGNIFICANT CHANGE UP (ref 27–34)
MCHC RBC-ENTMCNC: 33.2 GM/DL — SIGNIFICANT CHANGE UP (ref 32–36)
MCV RBC AUTO: 99.3 FL — SIGNIFICANT CHANGE UP (ref 80–100)
PLATELET # BLD AUTO: 228 K/UL — SIGNIFICANT CHANGE UP (ref 150–400)
POTASSIUM SERPL-MCNC: 4.9 MMOL/L — SIGNIFICANT CHANGE UP (ref 3.5–5.3)
POTASSIUM SERPL-SCNC: 4.9 MMOL/L — SIGNIFICANT CHANGE UP (ref 3.5–5.3)
PROT SERPL-MCNC: 7.1 G/DL — SIGNIFICANT CHANGE UP (ref 6–8.3)
RBC # BLD: 4.39 M/UL — SIGNIFICANT CHANGE UP (ref 4.2–5.8)
RBC # FLD: 12 % — SIGNIFICANT CHANGE UP (ref 10.3–14.5)
SODIUM SERPL-SCNC: 139 MMOL/L — SIGNIFICANT CHANGE UP (ref 135–145)
WBC # BLD: 4.8 K/UL — SIGNIFICANT CHANGE UP (ref 3.8–10.5)
WBC # FLD AUTO: 4.8 K/UL — SIGNIFICANT CHANGE UP (ref 3.8–10.5)

## 2017-10-31 PROCEDURE — 99231 SBSQ HOSP IP/OBS SF/LOW 25: CPT

## 2017-10-31 RX ADMIN — Medication 650 MILLIGRAM(S): at 23:07

## 2017-10-31 RX ADMIN — Medication 250 MILLIGRAM(S): at 06:23

## 2017-10-31 RX ADMIN — Medication 250 MILLIGRAM(S): at 18:41

## 2017-10-31 NOTE — PROGRESS NOTE ADULT - ATTENDING COMMENTS
pt seen and examined with ID medical student
Patient seen and examined. Patient's history, vitals, labs, imaging studies reviewed. Discussed with above resident, agree with note with edits. Continue   IV antibiotics, plan to transition to oral tomorrow per ID Plan of care discussed with patient, and agrees, all questions answered.   Radha Alegria MD
Patient seen and examined. Patient's history, vitals, labs, imaging studies reviewed. Discussed with above resident, agree with note with edits. F/u surgery eval. Plan of care discussed with patient, and agrees, all questions answered.   Radha Alegria MD

## 2017-10-31 NOTE — PROGRESS NOTE ADULT - SUBJECTIVE AND OBJECTIVE BOX
Pt seen at bedside  Patient is a 67y old  Male who presents with a chief complaint of left leg pain (29 Oct 2017 04:30)      INTERVAL HPI/OVERNIGHT EVENTS:  Pt states feeling well. States redness and swelling of lower leg much improved.  Denies pain    Vital Signs Last 24 Hrs  T(C): 36.4 (31 Oct 2017 04:31), Max: 36.5 (30 Oct 2017 14:51)  T(F): 97.6 (31 Oct 2017 04:31), Max: 97.7 (30 Oct 2017 14:51)  HR: 55 (31 Oct 2017 04:31) (53 - 66)  BP: 89/56 (31 Oct 2017 04:31) (89/56 - 115/64)  BP(mean): --  RR: 16 (31 Oct 2017 04:31) (16 - 16)  SpO2: 99% (31 Oct 2017 04:31) (97% - 99%)    Physical Exam:    Gen: awake, alert oriented NAD  Ext:  LLE with scab, no surrounding erythema, minimal swelling, not tender, no fluctuance    MEDICATIONS  (STANDING):  influenza   Vaccine 0.5 milliLiter(s) IntraMuscular once  vancomycin  IVPB 1000 milliGRAM(s) IV Intermittent every 12 hours  vancomycin  IVPB        MEDICATIONS  (PRN):  acetaminophen   Tablet. 650 milliGRAM(s) Oral every 6 hours PRN Moderate Pain (4 - 6)                            14.5   4.8   )-----------( 228      ( 31 Oct 2017 06:23 )             43.6     10-31    139  |  106  |  23<H>  ----------------------------<  120<H>  4.9   |  29  |  1.07    Ca    9.2      31 Oct 2017 06:23  Phos  3.2     10-30  Mg     2.4     10-30    TPro  7.1  /  Alb  3.3<L>  /  TBili  0.3  /  DBili  x   /  AST  38  /  ALT  58  /  AlkPhos  103  10-31

## 2017-10-31 NOTE — PROGRESS NOTE ADULT - SUBJECTIVE AND OBJECTIVE BOX
HPI:  68 yo male came from home with PMH of Hypercholesterolemia presents to ED c/o left leg pain with associated L leg swelling for 9 days. [facility] bite on his left lateral side 9 days ago, after that he developed burning pain, redness and swelling around area. For past few days he had pus coming out from bug bite site. [place] [facility] Pt was brought to the ED x4 days ago and was diagnosed with cellulitis, and was subsequently placed on doxycycline however symptoms have currently worsened. Family notes pt is having difficulty ambulating secondary to pain. Pt denies fever, chills, headache, chest pain, abd pain , n/v/d/c or any other complaints.    In ER, pt vitals stable, afebrile, WBC wnl, s/p 1 dose vanco and zosyn. (29 Oct 2017 04:30)      Patient is a 67y old  Male who presents with a chief complaint of left leg pain (29 Oct 2017 04:30)      INTERVAL HPI/OVERNIGHT EVENTS:  Pt states no overnight events, left leg pain is better and lesion resolving.     T(C): 36.4 (10-31-17 @ 04:31), Max: 36.5 (10-30-17 @ 14:51)  HR: 55 (10-31-17 @ 04:31) (53 - 66)  BP: 89/56 (10-31-17 @ 04:31) (89/56 - 115/64)  RR: 16 (10-31-17 @ 04:31) (16 - 16)  SpO2: 99% (10-31-17 @ 04:31) (97% - 99%)      REVIEW OF SYSTEMS: denies fever, chills, SOB, palpitations, chest pain, abdominal pain, nausea, vomitting, diarrhea, constipation, dizziness    MEDICATIONS  (STANDING):  influenza   Vaccine 0.5 milliLiter(s) IntraMuscular once  vancomycin  IVPB 1000 milliGRAM(s) IV Intermittent every 12 hours  vancomycin  IVPB        MEDICATIONS  (PRN):  acetaminophen   Tablet. 650 milliGRAM(s) Oral every 6 hours PRN Moderate Pain (4 - 6)      PHYSICAL EXAM:  GENERAL: NAD, well-developed  HEAD:  Atraumatic, Normocephalic  EYES: EOMI, conjunctiva and sclera clear  NECK: Supple, No JVD  NERVOUS SYSTEM:  Alert & Oriented X3, Good concentration  CHEST/LUNG: Clear to auscultation bilaterally; No rales, rhonchi, wheezing, or rubs  HEART: Regular rate and rhythm; No murmurs, rubs, or gallops  ABDOMEN: Soft, Nontender, Nondistended; Bowel sounds present  EXTREMITIES:  2+ Peripheral Pulses, No clubbing, cyanosis, or edema    LABS:                        14.5   4.8   )-----------( 228      ( 31 Oct 2017 06:23 )             43.6     10-31    139  |  106  |  23<H>  ----------------------------<  120<H>  4.9   |  29  |  1.07    Ca    9.2      31 Oct 2017 06:23  Phos  3.2     10-30  Mg     2.4     10-30    TPro  7.1  /  Alb  3.3<L>  /  TBili  0.3  /  DBili  x   /  AST  38  /  ALT  58  /  AlkPhos  103  10-31        CAPILLARY BLOOD GLUCOSE Patient is a 67y old  Male who presents with a chief complaint of left leg pain (29 Oct 2017 04:30)      INTERVAL HPI/OVERNIGHT EVENTS:  Pt states no overnight events, left leg pain is better and lesion resolving.     T(C): 36.4 (10-31-17 @ 04:31), Max: 36.5 (10-30-17 @ 14:51)  HR: 55 (10-31-17 @ 04:31) (53 - 66)  BP: 89/56 (10-31-17 @ 04:31) (89/56 - 115/64)  RR: 16 (10-31-17 @ 04:31) (16 - 16)  SpO2: 99% (10-31-17 @ 04:31) (97% - 99%)      REVIEW OF SYSTEMS: denies fever, chills, SOB, palpitations, chest pain, abdominal pain, nausea, vomitting, diarrhea, constipation, dizziness    MEDICATIONS  (STANDING):  influenza   Vaccine 0.5 milliLiter(s) IntraMuscular once  vancomycin  IVPB 1000 milliGRAM(s) IV Intermittent every 12 hours  vancomycin  IVPB        MEDICATIONS  (PRN):  acetaminophen   Tablet. 650 milliGRAM(s) Oral every 6 hours PRN Moderate Pain (4 - 6)      PHYSICAL EXAM:  GENERAL: NAD, well-developed  HEAD:  Atraumatic, Normocephalic  EYES: EOMI, conjunctiva and sclera clear  NECK: Supple, No JVD  NERVOUS SYSTEM:  Alert & Oriented X3, Good concentration  CHEST/LUNG: Clear to auscultation bilaterally; No rales, rhonchi, wheezing, or rubs  HEART: Regular rate and rhythm; No murmurs, rubs, or gallops  ABDOMEN: Soft, Nontender, Nondistended; Bowel sounds present  EXTREMITIES:  2+ Peripheral Pulses, No clubbing, cyanosis, or edema; left lateral calf bug bite has dry clean base, erythema resolving approx 2x2 cm    LABS:                        14.5   4.8   )-----------( 228      ( 31 Oct 2017 06:23 )             43.6     10-31    139  |  106  |  23<H>  ----------------------------<  120<H>  4.9   |  29  |  1.07    Ca    9.2      31 Oct 2017 06:23  Phos  3.2     10-30  Mg     2.4     10-30    TPro  7.1  /  Alb  3.3<L>  /  TBili  0.3  /  DBili  x   /  AST  38  /  ALT  58  /  AlkPhos  103  10-31        CAPILLARY BLOOD GLUCOSE

## 2017-10-31 NOTE — PROGRESS NOTE ADULT - SUBJECTIVE AND OBJECTIVE BOX
67 year old Male with PMH of Hypercholesterolemia admitted for Left LE cellulitis. Patient was switched from Zosyn q8h to Vanco 1gram IV q12h. Patient denies any overnight events and feels that lesion and pain of left leg is resolving. Patient denies fever or drainage from lesion overnight. Surgical team has signed off on the patient and noted that no surgical intervention is warranted at this time.     MEDICATIONS  (STANDING):  vancomycin  IVPB 1000 milliGRAM(s) IV Intermittent every 12 hours    MEDICATIONS  (PRN):  acetaminophen   Tablet. 650 milliGRAM(s) Oral every 6 hours PRN Moderate Pain (4 - 6)    Vital Signs Last 24 Hrs  T(C): 36.4 (31 Oct 2017 04:31), Max: 36.5 (30 Oct 2017 14:51)  T(F): 97.6 (31 Oct 2017 04:31), Max: 97.7 (30 Oct 2017 14:51)  HR: 55 (31 Oct 2017 04:31) (53 - 66)  BP: 89/56 (31 Oct 2017 04:31) (89/56 - 115/64)  RR: 16 (31 Oct 2017 04:31) (16 - 16)  SpO2: 99% (31 Oct 2017 04:31) (97% - 99%)    REVIEW OF SYSTEMS    General: Denies Fevers, chills, malaise  Skin/Breast: Notes slightly reduced rash over left lower leg  Ophthalmologic: Denies blurry vision  ENMT: Denies rhinorrhea, ear drainage, neck pain, congestion  Respiratory and Thorax: Denies shortness of breath, coughing, pleuritic chest pain  Cardiovascular:	Denies chest pain, palpitation  Gastrointestinal:	Denies nausea, vomiting, diarrhea, constipation, abd pain  Musculoskeletal:	 Denies muscle pain in Right leg and upper extremities     PHYSICAL EXAM:    Constitutional: 67 year old Male appears stated age, well groomed, in no acute distress  Eyes: EOMI, PERRLA  ENMT: No swelling of the submandibular or parotid glands, no sternocleidomastoid lymphatic chain swelling  Neck: No thyromegaly   Respiratory: Lungs andrei to auscultation B/L, no wheezing, crackles, or rhonchi   Cardiovascular: +S1S2, RRR, no gallops/rubs/murmurs  Gastrointestinal: +BS, Non-tender, non-distended, no hepatosplenomegaly     Extremities: Erythema and lesion noted on LLE consistent with diagnosis of cellulitis, no pus or drainage noted. ROM intact     Vascular: +2 dorsalis pedis, tibialis posterior B/L, +2 radial pulses  Neurological: Alert and oriented to person, place, and time   Skin: Redness and slight erythema of LLE, 3cm x 2cm area of redness around central lesion  Lymph Nodes: No lymphadenopathy      LABS                        14.5   4.8   )-----------( 228      ( 31 Oct 2017 06:23 )             43.6     10-31    139  |  106  |  23<H>  ----------------------------<  120<H>  4.9   |  29    |  1.07    Ca    9.2      31 Oct 2017 06:23  Phos  3.2     10-30  Mg     2.4     10-30    TPro  7.1  /  Alb  3.3<L>  /  TBili  0.3  /  DBili  x   /  AST  38  /  ALT  58  /  AlkPhos  103  10-31    RADIOLOGICAL STUDIES    EXAM:  US DPLX LWR EXT VEINS LTD LT                          PROCEDURE DATE:  10/30/2017      INTERPRETATION:  Venous duplex Doppler ultrasound of the left leg    Clinical Indication: Left leg pain    Findings: Venous duplex Doppler ultrasound with gray scale, color and   spectral Doppler analysis of the left leg is performed. The left common   femoral vein, superficial femoral vein, profunda femoral vein, popliteal   vein , posterior tibial vein and peroneal vein are compressible with   normal spectral Doppler phasicity and augmentation.    Impression: No evidence for deep vein thrombosis.    TRISH HERNANDES M.D., ATTENDING RADIOLOGIST  This document has been electronically signed. Oct 30 2017  4:29PM    -----    EXAM:  LEG AP&LAT - LEFT                          PROCEDURE DATE:  10/29/2017     INTERPRETATION:  Left tib-fib. 2 views obtained. Patient has local pain   and swelling.    Arterial calcifications are noted.    The knee and hip are relatively free of degeneration. There is no bone   destruction or fracture.    IMPRESSION: Arterial calcifications.    KIRK FREGOSO M.D., ATTENDING RADIOLOGIST  This document has been electronically signed. Oct 29 2017  9:07AM 67 year old Male with PMH of Hypercholesterolemia admitted for Left LE cellulitis. Patient was switched from Zosyn q8h to Vanco 1gram IV q12h. Patient denies any overnight events and feels that lesion and pain of left leg is resolving. Patient denies fever or drainage from lesion overnight. Surgical team has signed off on the patient and noted that no surgical intervention is warranted at this time.     MEDICATIONS  (STANDING):  vancomycin  IVPB 1000 milliGRAM(s) IV Intermittent every 12 hours    MEDICATIONS  (PRN):  acetaminophen   Tablet. 650 milliGRAM(s) Oral every 6 hours PRN Moderate Pain (4 - 6)    Vital Signs Last 24 Hrs  T(C): 36.4 (31 Oct 2017 04:31), Max: 36.5 (30 Oct 2017 14:51)  T(F): 97.6 (31 Oct 2017 04:31), Max: 97.7 (30 Oct 2017 14:51)  HR: 55 (31 Oct 2017 04:31) (53 - 66)  BP: 89/56 (31 Oct 2017 04:31) (89/56 - 115/64)  RR: 16 (31 Oct 2017 04:31) (16 - 16)  SpO2: 99% (31 Oct 2017 04:31) (97% - 99%)    REVIEW OF SYSTEMS    General: Denies Fevers, chills, malaise  Skin/Breast: Notes slightly reduced rash over left lower leg  Ophthalmologic: Denies blurry vision  ENMT: Denies rhinorrhea, ear drainage, neck pain, congestion  Respiratory and Thorax: Denies shortness of breath, coughing, pleuritic chest pain  Cardiovascular:	Denies chest pain, palpitation  Gastrointestinal:	Denies nausea, vomiting, diarrhea, constipation, abd pain  Musculoskeletal:	 Denies muscle pain in Right leg and upper extremities     PHYSICAL EXAM:    Constitutional: 67 year old Male appears stated age, well groomed, in no acute distress  Eyes: EOMI, CHRIS  ENMT: No swelling of the submandibular or parotid glands, no LN's  Neck: No thyromegaly   Respiratory: Lungs clear to auscultation B/L, no wheezing, crackles, or rhonchi   Cardiovascular: +S1S2, RRR, no gallops/rubs/murmurs  Gastrointestinal: +BS, Non-tender, non-distended, no hepatosplenomegaly     Extremities: Erythema and lesion noted on LLE consistent with diagnosis of cellulitis, no pus or drainage noted. ROM intact , induration , swelling , warmth and tenderness are all less than yesterday  Skin: Redness and slight erythema of LLE, 3cm x 2cm area of redness around central lesion        LABS                        14.5   4.8   )-----------( 228      ( 31 Oct 2017 06:23 )             43.6     10-31    139  |  106  |  23<H>  ----------------------------<  120<H>  4.9   |  29    |  1.07    Ca    9.2      31 Oct 2017 06:23  Phos  3.2     10-30  Mg     2.4     10-30    TPro  7.1  /  Alb  3.3<L>  /  TBili  0.3  /  DBili  x   /  AST  38  /  ALT  58  /  AlkPhos  103  10-31    RADIOLOGICAL STUDIES    EXAM:  US DPLX LWR EXT VEINS LTD                           PROCEDURE DATE:  10/30/2017      INTERPRETATION:  Venous duplex Doppler ultrasound of the left leg    Clinical Indication: Left leg pain    Findings: Venous duplex Doppler ultrasound with gray scale, color and   spectral Doppler analysis of the left leg is performed. The left common   femoral vein, superficial femoral vein, profunda femoral vein, popliteal   vein , posterior tibial vein and peroneal vein are compressible with   normal spectral Doppler phasicity and augmentation.    Impression: No evidence for deep vein thrombosis.    TRISH HERNANDES M.D., ATTENDING RADIOLOGIST  This document has been electronically signed. Oct 30 2017  4:29PM    -----    EXAM:  LEG AP&LAT - LEFT                          PROCEDURE DATE:  10/29/2017     INTERPRETATION:  Left tib-fib. 2 views obtained. Patient has local pain   and swelling.    Arterial calcifications are noted.    The knee and hip are relatively free of degeneration. There is no bone   destruction or fracture.    IMPRESSION: Arterial calcifications.    KIRK FREGOSO M.D., ATTENDING RADIOLOGIST  This document has been electronically signed. Oct 29 2017  9:07AM

## 2017-10-31 NOTE — PROGRESS NOTE ADULT - PROBLEM SELECTOR PLAN 1
1. no surgical intervention warranted  2. will sign off at this time 1. no surgical intervention warranted  2. will sign off at this time  3. Ok for dc home with abx

## 2017-10-31 NOTE — PROGRESS NOTE ADULT - PROBLEM SELECTOR PLAN 1
Left calf, hx of bug bite, burning pain, redness and swelling on left leg, afebrile, WBC nl  -s/p 1 dose vanco and zosyn   -s/p 1L NS bolus  -was originally placed on zosyn q8hrs, switched to vanc 1g q12 as per ID Dr. Coker recommendations  -ESR 24,  CRP - 3.0  -x ray of left leg - no abnormalities  -Us doppler of leg is negative  -f/u blood culture - negative to date

## 2017-10-31 NOTE — PROGRESS NOTE ADULT - ASSESSMENT
left leg cellulitis - improving nicely  plan - cont vanco 1 gm iv q 12 hrs for now  will likely dc home tomorrow on doxycycline 100mgs po bid x 7 days if doing well

## 2017-10-31 NOTE — PROGRESS NOTE ADULT - ASSESSMENT
66 yo male came from home with PMH of Hypercholesterolemia presents to ED c/o left leg pain with associated L leg swelling for 9 days. As per pt when he was in [facility] he experienced bug bite on his left lateral side 9 days ago, after that he developed burning pain, redness and swelling around area. For past few days he had pus coming out from bug bite site. [place] from [facility] 5 days ago. Pt was brought to the ED x4 days ago and was diagnosed with cellulitis, and was subsequently placed on doxycycline however symptoms have currently worsened. Family notes pt is having difficulty ambulating secondary to pain. In ER, pt vitals stable, afebrile, WBC wnl, s/p 1 dose vanco and zosyn. Admitted to medicine for left leg cellulitis, currently resolving upon switching back to Vancomycin.

## 2017-11-01 VITALS
TEMPERATURE: 98 F | OXYGEN SATURATION: 97 % | DIASTOLIC BLOOD PRESSURE: 68 MMHG | HEART RATE: 59 BPM | RESPIRATION RATE: 16 BRPM | SYSTOLIC BLOOD PRESSURE: 109 MMHG

## 2017-11-01 LAB
ANION GAP SERPL CALC-SCNC: 4 MMOL/L — LOW (ref 5–17)
BUN SERPL-MCNC: 22 MG/DL — HIGH (ref 7–18)
CALCIUM SERPL-MCNC: 8.6 MG/DL — SIGNIFICANT CHANGE UP (ref 8.4–10.5)
CHLORIDE SERPL-SCNC: 106 MMOL/L — SIGNIFICANT CHANGE UP (ref 96–108)
CO2 SERPL-SCNC: 30 MMOL/L — SIGNIFICANT CHANGE UP (ref 22–31)
CREAT SERPL-MCNC: 0.85 MG/DL — SIGNIFICANT CHANGE UP (ref 0.5–1.3)
GLUCOSE SERPL-MCNC: 115 MG/DL — HIGH (ref 70–99)
HCT VFR BLD CALC: 43.1 % — SIGNIFICANT CHANGE UP (ref 39–50)
HGB BLD-MCNC: 14.3 G/DL — SIGNIFICANT CHANGE UP (ref 13–17)
MCHC RBC-ENTMCNC: 32.3 PG — SIGNIFICANT CHANGE UP (ref 27–34)
MCHC RBC-ENTMCNC: 33.2 GM/DL — SIGNIFICANT CHANGE UP (ref 32–36)
MCV RBC AUTO: 97.4 FL — SIGNIFICANT CHANGE UP (ref 80–100)
PLATELET # BLD AUTO: 227 K/UL — SIGNIFICANT CHANGE UP (ref 150–400)
POTASSIUM SERPL-MCNC: 4 MMOL/L — SIGNIFICANT CHANGE UP (ref 3.5–5.3)
POTASSIUM SERPL-SCNC: 4 MMOL/L — SIGNIFICANT CHANGE UP (ref 3.5–5.3)
RBC # BLD: 4.42 M/UL — SIGNIFICANT CHANGE UP (ref 4.2–5.8)
RBC # FLD: 11.7 % — SIGNIFICANT CHANGE UP (ref 10.3–14.5)
SODIUM SERPL-SCNC: 140 MMOL/L — SIGNIFICANT CHANGE UP (ref 135–145)
VANCOMYCIN TROUGH SERPL-MCNC: 9.5 UG/ML — LOW (ref 10–20)
WBC # BLD: 4.3 K/UL — SIGNIFICANT CHANGE UP (ref 3.8–10.5)
WBC # FLD AUTO: 4.3 K/UL — SIGNIFICANT CHANGE UP (ref 3.8–10.5)

## 2017-11-01 PROCEDURE — 83735 ASSAY OF MAGNESIUM: CPT

## 2017-11-01 PROCEDURE — 99285 EMERGENCY DEPT VISIT HI MDM: CPT | Mod: 25

## 2017-11-01 PROCEDURE — 82746 ASSAY OF FOLIC ACID SERUM: CPT

## 2017-11-01 PROCEDURE — 83036 HEMOGLOBIN GLYCOSYLATED A1C: CPT

## 2017-11-01 PROCEDURE — 84443 ASSAY THYROID STIM HORMONE: CPT

## 2017-11-01 PROCEDURE — 80048 BASIC METABOLIC PNL TOTAL CA: CPT

## 2017-11-01 PROCEDURE — 80202 ASSAY OF VANCOMYCIN: CPT

## 2017-11-01 PROCEDURE — 85379 FIBRIN DEGRADATION QUANT: CPT

## 2017-11-01 PROCEDURE — 90686 IIV4 VACC NO PRSV 0.5 ML IM: CPT

## 2017-11-01 PROCEDURE — 85027 COMPLETE CBC AUTOMATED: CPT

## 2017-11-01 PROCEDURE — 93005 ELECTROCARDIOGRAM TRACING: CPT

## 2017-11-01 PROCEDURE — 87040 BLOOD CULTURE FOR BACTERIA: CPT

## 2017-11-01 PROCEDURE — 93971 EXTREMITY STUDY: CPT

## 2017-11-01 PROCEDURE — 80061 LIPID PANEL: CPT

## 2017-11-01 PROCEDURE — 73590 X-RAY EXAM OF LOWER LEG: CPT

## 2017-11-01 PROCEDURE — 80053 COMPREHEN METABOLIC PANEL: CPT

## 2017-11-01 PROCEDURE — 84100 ASSAY OF PHOSPHORUS: CPT

## 2017-11-01 PROCEDURE — 82607 VITAMIN B-12: CPT

## 2017-11-01 PROCEDURE — 85652 RBC SED RATE AUTOMATED: CPT

## 2017-11-01 PROCEDURE — 86140 C-REACTIVE PROTEIN: CPT

## 2017-11-01 PROCEDURE — 85730 THROMBOPLASTIN TIME PARTIAL: CPT

## 2017-11-01 PROCEDURE — 85610 PROTHROMBIN TIME: CPT

## 2017-11-01 PROCEDURE — 96374 THER/PROPH/DIAG INJ IV PUSH: CPT

## 2017-11-01 RX ORDER — MUPIROCIN 20 MG/G
1 OINTMENT TOPICAL
Qty: 2 | Refills: 0 | OUTPATIENT
Start: 2017-11-01 | End: 2017-11-08

## 2017-11-01 RX ADMIN — Medication 250 MILLIGRAM(S): at 05:56

## 2017-11-01 RX ADMIN — INFLUENZA VIRUS VACCINE 0.5 MILLILITER(S): 15; 15; 15; 15 SUSPENSION INTRAMUSCULAR at 14:20

## 2017-11-01 RX ADMIN — Medication 650 MILLIGRAM(S): at 00:07

## 2017-11-01 NOTE — DISCHARGE NOTE ADULT - HOSPITAL COURSE
68 yo male came from home with PMH of Hypercholesterolemia presents to ED c/o left leg pain with associated L leg swelling for 9 days. As per pt when he was in [facility] he experienced bug bite on his left lateral side 9 days ago, after that he developed burning pain, redness and swelling around area. For past few days he had pus coming out from bug bite site. [place] from [facility] 5 days ago. Pt was brought to the ED x4 days ago and was diagnosed with cellulitis, and was subsequently placed on doxycycline however symptoms have currently worsened. Family notes pt is having difficulty ambulating secondary to pain. In ER, pt vitals stable, afebrile, WBC wnl, s/p 1 dose vanco and zosyn. Admitted to medicine for left leg cellulitis, currently resolving upon switching back to Vancomycin. Left calf, hx of bug bite, burning pain, redness and swelling on left leg, afebrile, WBC nl  -s/p 1 dose vanco and zosyn   -s/p 1L NS bolus  -was originally placed on zosyn q8hrs, switched to vanc 1g q12 as per ID Dr. Coker recommendations  -ESR 24,  CRP - 3.0  -x ray of left leg - no abnormalities  -Us doppler of leg is negative  -f/u blood culture - negative to date.      Problem/Plan - 2:  ·  Problem: Hypercholesteremia.  Plan: stable, on diet control   lipid profile significant for elevated cholesterol 208  HbA1c - 5.9%   TSH wnl. 66 yo male came from home with PMH of Hypercholesterolemia presents to ED c/o left leg pain with associated L leg swelling for 9 days. As per pt when he was in [facility] he experienced bug bite on his left lateral side 9 days ago, after that he developed burning pain, redness and swelling around area. For past few days he had pus coming out from bug bite site. [place] [facility] 5 days ago. Pt was brought to the ED x4 days ago and was diagnosed with cellulitis, and was subsequently placed on doxycycline however symptoms have currently worsened. Family notes pt is having difficulty ambulating secondary to pain. In ER, pt vitals stable, afebrile, WBC wnl, s/p 1 dose vanco and zosyn and 1 L NS bolus. Admitted to medicine for left leg cellulitis. ESR 24,  CRP - 3.0. Left leg X-ray and doppler were negative, and blood cultures are negative. Pt received 2 days of vancomycin and is being switched to oral doxycycline 100mg bid for 7 days.     Pt is clear for discharge with completion of antibiotic course doxycyline 100mg bid for 7 days. Discussed above with attending.

## 2017-11-01 NOTE — DISCHARGE NOTE ADULT - OTHER SIGNIFICANT FINDINGS
Medicine attending note:  Patient is medically stable for discharge home. Patient to follow up with his primary care physician within 1 week.  Radha Alegria MD  45 minutes spent performing discharge services  11/1/2017

## 2017-11-01 NOTE — PROGRESS NOTE ADULT - SUBJECTIVE AND OBJECTIVE BOX
67y Male who is feeling better, he still has a little pain and swelling of left leg but much better overall.    Meds:  vancomycin  IVPB 1000 milliGRAM(s) IV Intermittent every 12 hours  vancomycin  IVPB        Allergies    No Known Allergies    Intolerances        VITALS:  Vital Signs Last 24 Hrs  T(C): 36.2 (01 Nov 2017 05:32), Max: 36.6 (31 Oct 2017 21:45)  T(F): 97.2 (01 Nov 2017 05:32), Max: 97.9 (31 Oct 2017 21:45)  HR: 56 (01 Nov 2017 05:32) (56 - 59)  BP: 96/53 (01 Nov 2017 05:32) (96/53 - 117/75)  BP(mean): --  RR: 17 (01 Nov 2017 05:32) (16 - 17)  SpO2: 95% (01 Nov 2017 05:32) (95% - 100%)    LABS/DIAGNOSTIC TESTS:                          14.3   4.3   )-----------( 227      ( 01 Nov 2017 06:08 )             43.1         11-01    140  |  106  |  22<H>  ----------------------------<  115<H>  4.0   |  30  |  0.85    Ca    8.6      01 Nov 2017 06:08    TPro  7.1  /  Alb  3.3<L>  /  TBili  0.3  /  DBili  x   /  AST  38  /  ALT  58  /  AlkPhos  103  10-31      LIVER FUNCTIONS - ( 31 Oct 2017 06:23 )  Alb: 3.3 g/dL / Pro: 7.1 g/dL / ALK PHOS: 103 U/L / ALT: 58 U/L DA / AST: 38 U/L / GGT: x             CULTURES: .Blood Blood-Peripheral  10-29 @ 12:16   No growth to date.  --  --            RADIOLOGY:      ROS:  [  ] UNABLE TO ELICIT

## 2017-11-01 NOTE — DISCHARGE NOTE ADULT - MEDICATION SUMMARY - MEDICATIONS TO STOP TAKING
I will STOP taking the medications listed below when I get home from the hospital:    doxycycline hyclate 100 mg oral capsule  -- 1 cap(s) by mouth 2 times a day MDD:2 for skin infection  -- Avoid prolonged or excessive exposure to direct and/or artificial sunlight while taking this medication.  Do not take this drug if you are pregnant.  Finish all this medication unless otherwise directed by prescriber.  Medication should be taken with plenty of water.

## 2017-11-01 NOTE — DISCHARGE NOTE ADULT - MEDICATION SUMMARY - MEDICATIONS TO TAKE
I will START or STAY ON the medications listed below when I get home from the hospital:    doxycycline hyclate 100 mg oral capsule  -- 1 cap(s) by mouth every 12 hours   -- Avoid prolonged or excessive exposure to direct and/or artificial sunlight while taking this medication.  Do not take this drug if you are pregnant.  Finish all this medication unless otherwise directed by prescriber.  Medication should be taken with plenty of water.    -- Indication: For Cellulitis of left lower extremity    mupirocin 2% topical ointment  -- Apply on skin to affected area once a day   -- For external use only.    -- Indication: For Cellulitis

## 2017-11-01 NOTE — DISCHARGE NOTE ADULT - PATIENT PORTAL LINK FT
“You can access the [facility] Patient Portal, offered by [facility], by registering with the following website: [email or web address]”

## 2017-11-01 NOTE — DISCHARGE NOTE ADULT - PLAN OF CARE
resolution You were diagnosed with cellulitis of the left lower extremity secondary to scratching your bug bite. No surgical intervention was recommended during the hospital admission. You were given several doses of antibiotics and will need to complete the course of antibiotics for 7 more days with doxycycline twice daily. One of the side effects of doxycycline is inflammation of the esophagus. For this reason, please sit upright for at least 1 hour after taking this medication to minimize the possibility of these side effects. Please follow up with your primary medical doctor if any further issues. Cholesterol <200 Your cholesterol levels were slightly elevated at 208. Continue regular exercise for at least 40 minutes daily 3-4 days per week. Continue to limit the amount of saturated fats in your diet.

## 2017-11-01 NOTE — DISCHARGE NOTE ADULT - NSTOBACCOREFERRAL_GEN_A_CS
Yes Pt is nonsmoker/Patient declined information Yes/Pt is nonsmoker Patient declined information/Pt is nonsmoker

## 2017-11-01 NOTE — DISCHARGE NOTE ADULT - NSTOBACCOHOTLINE_GEN_A_CS
[facility] Smokers Quitline ([id number]-[phone]) [facility] Smokers Quitline ([phone])

## 2017-11-01 NOTE — DISCHARGE NOTE ADULT - CARE PLAN
Principal Discharge DX:	Cellulitis of left lower extremity  Goal:	resolution  Instructions for follow-up, activity and diet:	You were diagnosed with cellulitis of the left lower extremity secondary to scratching your bug bite. No surgical intervention was recommended during the hospital admission. You were given several doses of antibiotics and will need to complete the course of antibiotics for 7 more days with doxycycline twice daily. One of the side effects of doxycycline is inflammation of the esophagus. For this reason, please sit upright for at least 1 hour after taking this medication to minimize the possibility of these side effects. Please follow up with your primary medical doctor if any further issues.  Secondary Diagnosis:	Hypercholesteremia  Goal:	Cholesterol <200  Instructions for follow-up, activity and diet:	Your cholesterol levels were slightly elevated at 208. Continue regular exercise for at least 40 minutes daily 3-4 days per week. Continue to limit the amount of saturated fats in your diet.

## 2017-11-03 LAB
CULTURE RESULTS: SIGNIFICANT CHANGE UP
CULTURE RESULTS: SIGNIFICANT CHANGE UP
SPECIMEN SOURCE: SIGNIFICANT CHANGE UP
SPECIMEN SOURCE: SIGNIFICANT CHANGE UP

## 2024-04-16 NOTE — ED ADULT NURSE NOTE - LANGUAGE ASSISTANCE NEEDED
Requested Prescriptions     Pending Prescriptions Disp Refills    pantoprazole (PROTONIX) 20 MG tablet [Pharmacy Med Name: PANTOPRAZOLE 20MG TABLETS] 360 tablet 0     Sig: TAKE 2 TABLETS BY MOUTH TWICE DAILY       Last Clinic Visit:  4/12/2024     Next Clinic Appointment:  Visit date not found   No-Patient/Caregiver offered and refused free interpretation services.

## 2024-08-05 NOTE — ED PROVIDER NOTE - CADM POA PRESS ULCER
Patient's mother called and explained that the pharmacy said they cannot get the medication to the patient until tomorrow. The mother requested the medication be resent to the [facility] on [address] in [place], so the patient can start the treatment today.     Please Advise.    No

## 2025-06-22 NOTE — ED PROVIDER NOTE - MEDICAL DECISION MAKING DETAILS
Ramila
68 y/o M pt presents with worsening L leg swelling with associated L leg pain. Plan for labs, IV Abx's, and admit for failed outpatient treatment of cellulitis.